# Patient Record
Sex: MALE | Race: WHITE
[De-identification: names, ages, dates, MRNs, and addresses within clinical notes are randomized per-mention and may not be internally consistent; named-entity substitution may affect disease eponyms.]

---

## 2018-07-18 ENCOUNTER — HOSPITAL ENCOUNTER (EMERGENCY)
Dept: HOSPITAL 25 - UCEAST | Age: 48
Discharge: HOME | End: 2018-07-18
Payer: COMMERCIAL

## 2018-07-18 VITALS — DIASTOLIC BLOOD PRESSURE: 99 MMHG | SYSTOLIC BLOOD PRESSURE: 152 MMHG

## 2018-07-18 DIAGNOSIS — R03.0: ICD-10-CM

## 2018-07-18 DIAGNOSIS — F17.210: ICD-10-CM

## 2018-07-18 DIAGNOSIS — N20.1: Primary | ICD-10-CM

## 2018-07-18 PROCEDURE — 81003 URINALYSIS AUTO W/O SCOPE: CPT

## 2018-07-18 PROCEDURE — G0463 HOSPITAL OUTPT CLINIC VISIT: HCPCS

## 2018-07-18 PROCEDURE — 99202 OFFICE O/P NEW SF 15 MIN: CPT

## 2018-07-18 PROCEDURE — 74176 CT ABD & PELVIS W/O CONTRAST: CPT

## 2018-07-18 NOTE — UC
Complaint Male HPI





- HPI Summary


HPI Summary: 


This is danielle Morleyhumble Whalen documenting for Arun Blount MD.





This patient is a 48 year old M presenting to [facility] accompanied by wife with a 

chief complaint of waxing and waning L flank pain radiating to groin that began 

this morning at 0830. The patient rates the pain 4/10 in severity. Symptoms 

aggravated by nothing. Symptoms alleviated by ambulation and Tylenol. Patient 

reports urinary urgency and decreased urinary output. Patient denies testicular 

pain, fever, and chills. Allergies reviewed. Medications reviewed.








- History of Current Complaint


Chief Complaint: UCGU


Stated Complaint: LOW BACK PAIN


Time Seen by Provider: 07/18/18 15:16


Hx Obtained From: Patient


Onset/Duration: Sudden Onset, Lasting Hours, Still Present


Timing: Constant


Severity Initially: Moderate


Severity Currently: Moderate


Pain Intensity: 4


Pain Scale Used: 0-10 Numeric


Location: Flank


Aggravating Factor(s): Palpation


Alleviating Factor(s): Meds, Movement


Associated Signs And Symptoms: Negative: Fever





- Allergies/Home Medications


Allergies/Adverse Reactions: 


 Allergies











Allergy/AdvReac Type Severity Reaction Status Date / Time


 


No Known Allergies Allergy   Verified 07/18/18 15:13














PMH/Surg Hx/FS Hx/Imm Hx


Previously Healthy: Yes


Endocrine History: Other


Other Endocrine History: Negative HTN


GI/ History: Other


Other GI/ History: Negative kidney stones





- Surgical History


Surgical History: Yes


Surgery Procedure, Year, and Place: LEFT elbow surgery.  Tonsillectomy at 8 yoa





- Family History


Known Family History: 


   Negative: Cardiac Disease, Diabetes





- Social History


Occupation: Employed Full-time


Lives: With Family


Alcohol Use: Daily


Substance Use Type: None


Smoking Status (MU): Heavy Every Day Tobacco Smoker


Type: Cigarettes


Length of Time of Smoking/Using Tobacco: 30





Review of Systems


Constitutional: Other - Negative feer and chills


Gastrointestinal: Other - Positive flank pain


Genitourinary: Urgency, Other - Positive decreased urinary output. Negative 

testicular pain


All Other Systems Reviewed And Are Negative: Yes





Physical Exam





- Summary


Physical Exam Summary: 


General: well-appearing, no pain distress


Skin: warm, color reflects adequate perfusion, dry


Head: normal


Eyes: EOMI, SUSU


ENT: normal


Neck: supple, nontender


Respiratory: CTA, breath sounds present


Cardiovascular: RRR


Abdomen: soft, nontender. Mild tenderness to percussion left flank and left 

lower abd. No ULQ tenderness


Bowel: present


Musculoskeletal: normal, strength/ROM intact


Neurological: sensory/motor intact, A&O x3


Psychological: affect/mood appropriate





Triage Information Reviewed: Yes


Vital Signs: 


 Initial Vital Signs











Temp  98.8 F   07/18/18 15:09


 


Pulse  87   07/18/18 15:09


 


Resp  20   07/18/18 15:09


 


BP  152/99   07/18/18 15:09


 


Pulse Ox  99   07/18/18 15:09











Vital Signs Reviewed: Yes





Diagnostics





- Radiology


  ** Abdomen and Pelvis CT


Radiology Interpretation Completed By: Radiologist - Abdomen and pelvis CT 

reveals, per radiologist, 0.2 cm calculus of the distal left ureter, just 

proximal to the left UVJ, with mild left-sided hydronephrosis. ED physician has 

reviewed this radiology report.





Re-Evaluation





- Re-Evaluation


  ** First Eval


Re-Evaluation Time: 16:02


Change: Unchanged


Comment: Discussed results and plan of care with pt





 Complaint Male Course/Dx





- Course


Course Of Treatment: DISCUSSED CT/UA REPORT WITH PATIENT AND PARTNER.  THE PLAN 

IS PO FLUIDS, STRAIN URE, FLOMAX, PAIN CONTROL AND F/U WITH UROLOGY. GO TO ED 

IF WORSE.  BP noted and advised to follow up with PCP





- Differential Dx/Diagnosis


Provider Diagnoses: LEFT URETEROLITHIASIS.  Elevated BP without dx of HTN





Discharge





- Sign-Out/Discharge


Documenting (check all that apply): Patient Departure





- Discharge Plan


Condition: Stable


Disposition: HOME


Prescriptions: 


HYDROcodone/ACETAMIN 5-325 MG* [Norco 5-325 TAB*] 1 tab PO Q4H PRN #20 tab MDD 6


 PRN Reason: Pain


Tamsulosin CAP* [Flomax CAP*] 0.4 mg PO DAILY #5 cap


Patient Education Materials:  Kidney Stones (ED)


Referrals: 


[facility] [[place]]


Hardik Bang MD [Medical Doctor] - 


Gordo Garcia MD [Medical Doctor] - 


Additional Instructions: 


FOLLOW UP WITH UROLOGY.


DRINK PLENTY OF WATER.


GET RECHECKED FOR ANY WORSENING OF YOUR CONDITION; PAIN, VOMITING, FEVER, SIGNS 

OF INFECTION OR QUESTIONS OR CONCERNS.





- Billing Disposition and Condition


Condition: STABLE


Disposition: Home

## 2018-07-18 NOTE — RAD
CLINICAL HISTORY: left flank pain 



COMPARISON: None



TECHNIQUE: Multiple contiguous axial CT scans were obtained of the abdomen and pelvis

after the administration of intravenous contrast. Coronal and sagittal multiplanar

reformations are submitted for review.  Oral contrast was not administered.



FINDINGS: 

The study is limited by the lack of intravenous contrast. This limits evaluation of the

solid organs and vasculature.





LUNG BASES: The lung bases are clear.



LIVER: The liver is normal in shape, size, contour, and attenuation.

BILE DUCTS: There is no intrahepatic or extrahepatic biliary dilatation.

GALLBLADDER: The gallbladder is normal, without pericholecystic inflammatory change.



PANCREAS: The pancreas is normal, without mass or ductal dilatation.

SPLEEN: Normal in size and appearance.



UPPER GI TRACT: Evaluation of the gastrointestinal tract is limited by incomplete gastric

distention. The upper GI tract is unremarkable.

SMALL BOWEL AND MESENTERY: The small bowel is normal in contour, course, and caliber.

There is no obstruction or dilatation.

COLON: The colon is normal in contour, course, caliber. There is no pericolonic

inflammatory change.



ADRENALS: Normal bilaterally.

KIDNEYS: There is mild pelvocaliectasis and hydroureter on the left. There is a 0.2 cm

calculus of the distal ureter just proximal to the left UVJ.

BLADDER: The bladder is collapsed and is not well evaluated.



PELVIC ORGANS: The prostate gland is normal. The seminal vesicles are symmetric.



AORTA: There is calcific atherosclerotic disease of the abdominal aorta and its branches,

without aneurysmal dilatation

IVC: Unremarkable



LYMPH NODES: There is no lymphadenopathy by size criteria.



ABDOMINAL WALL: There is no evidence for abdominal wall hernia.

BONES AND SOFT TISSUES: Mild degenerative changes are noted most pronounced at L4-L5.

OTHER: None



IMPRESSION:

0.2 CM CALCULUS OF THE DISTAL LEFT URETER, JUST PROXIMAL TO THE LEFT UVJ, WITH MILD

LEFT-SIDED HYDRONEPHROSIS.